# Patient Record
Sex: MALE | Race: ASIAN | NOT HISPANIC OR LATINO | Employment: UNEMPLOYED | URBAN - METROPOLITAN AREA
[De-identification: names, ages, dates, MRNs, and addresses within clinical notes are randomized per-mention and may not be internally consistent; named-entity substitution may affect disease eponyms.]

---

## 2020-01-20 ENCOUNTER — THERAPY VISIT (OUTPATIENT)
Dept: PHYSICAL THERAPY | Facility: CLINIC | Age: 70
End: 2020-01-20
Payer: MEDICARE

## 2020-01-20 DIAGNOSIS — M25.512 CHRONIC LEFT SHOULDER PAIN: ICD-10-CM

## 2020-01-20 DIAGNOSIS — G89.29 CHRONIC LEFT SHOULDER PAIN: ICD-10-CM

## 2020-01-20 PROCEDURE — 97035 APP MDLTY 1+ULTRASOUND EA 15: CPT | Mod: GP | Performed by: PHYSICAL THERAPIST

## 2020-01-20 PROCEDURE — 97110 THERAPEUTIC EXERCISES: CPT | Mod: GP | Performed by: PHYSICAL THERAPIST

## 2020-01-20 PROCEDURE — 97161 PT EVAL LOW COMPLEX 20 MIN: CPT | Mod: GP | Performed by: PHYSICAL THERAPIST

## 2020-01-20 NOTE — LETTER
DEPARTMENT OF HEALTH AND HUMAN SERVICES  CENTERS FOR MEDICARE & MEDICAID SERVICES    PLAN/UPDATED PLAN OF PROGRESS FOR OUTPATIENT REHABILITATION    PATIENTS NAME:  SARKIS Nick   : 1950  PROVIDER NUMBER:    [id number]  HICN:[id number]  PROVIDER NAME: [facility] HILARY JANG  MEDICAL RECORD NUMBER: [id number]   START OF CARE DATE:  SOC Date: 20   TYPE:  PT  PRIMARY/TREATMENT DIAGNOSIS: (Pertinent Medical Diagnosis)  Chronic left shoulder pain  VISITS FROM START OF CARE:  Rxs Used: 1     [place] [facility] Initial Evaluation  Subjective:  The history is provided by the patient. The history is limited by a language barrier. A  was used.   SARKIS Nick being seen for Left shoulder to the elbow.   Problem began 2020 (MD consult). Where condition occurred: for unknown reasons.Problem occurred: unknown  and reported as 7/10 on pain scale. General health as reported by patient is fair. Pertinent medical history includes:  Kidney disease, osteoarthritis, high blood pressure and depression.   Other medical allergies details: none.  Surgeries include:  None.  Current medications:  High blood pressure medication. Other medications details: takes 2 others, unsure what they are.    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  Pain is described as aching and is constant. Pain is worse during the night. Since onset symptoms are gradually worsening. Special tests:  X-ray.     Patient is none.   Barriers include:  None as reported by patient (lives with his son).  Red flags:  None as reported by patient.  Type of problem:  Left shoulder   Condition occurred with:  Unknown cause. This is a chronic condition   Problem details: Pt notes that he first started feeling pain in his left shoulder in Oct 2019, and it has progressively worsened. He is unsure what caused the pain. He did consult with his PCP for this and was referred to PT..   Patient reports pain:  Upper arm and  lateral. Radiates to:  Elbow. Associated symptoms:  Loss of motion/stiffness, painful arc and loss of strength. Symptoms are exacerbated by lifting, carrying, lying on extremity, using arm at shoulder level, using arm overhead and using arm behind back and relieved by rest (self massage).    Objective:  Standing Alignment:    Cervical/Thoracic:  Forward head (moderate)  Shoulder/UE:  Rounded shoulders (moderate)  Shoulder Evaluation:  ROM:  AROM:    Flexion:  Left:  130 deg      Abduction:  Left: 130 deg     External Rotation:  Left:  73 deg      Extension/Internal Rotation:  Left:  Thumb to same level as R at about T11        Pain: with all movements, worst with flex and abd    Strength:    Flexion: Left:3/5    Pain: ++      Abduction:  Left: 3/5   Pain:++      External Rotation:   Left:3+/5      Pain:+     Elbow Flexion:  Left:4-/5      Pain:+        Special Tests:    Left shoulder positive for the following special tests:  Impingement; Rotator cuff tear and Acromioclavicular  Left shoulder negative for the following special tests:  Bursal    Palpation:    Left shoulder tenderness present at:  Acrimioclavicular; Biceps; Supraspinatus; Infraspinatus; Levator; Upper Trap and Bicipital Groove  Left shoulder tenderness not present at: Deltoid    Assessment/Plan:    Patient is a 69 year old male with left side shoulder complaints.    Patient has the following significant findings with corresponding treatment plan.                Diagnosis 1:  L shoulder strain  Pain -  hot/cold therapy, US, self management, education and home program  Decreased ROM/flexibility - therapeutic exercise  Decreased strength - therapeutic exercise and therapeutic activities  Impaired muscle performance - neuro re-education  Decreased function - therapeutic activities  Impaired posture - neuro re-education and therapeutic activities    Therapy Evaluation Codes:   1) History comprised of:   Personal factors that impact the plan of care:       Education, Language, Past/current experiences, Social history/culture and Time since onset of symptoms.    Comorbidity factors that impact the plan of care are:      Depression, High blood pressure and Osteoarthritis.     Medications impacting care: High blood pressure.  2) Examination of Body Systems comprised of:   Body structures and functions that impact the plan of care:      Shoulder.   Activity limitations that impact the plan of care are:      Dressing, Lifting, Laying down and Reaching.  3) Clinical presentation characteristics are:   Stable/Uncomplicated.  4) Decision-Making    Low complexity using standardized patient assessment instrument and/or measureable assessment of functional outcome.  Cumulative Therapy Evaluation is: Low complexity.  Previous and current functional limitations:  (See Goal Flow Sheet for this information)    Short term and Long term goals: (See Goal Flow Sheet for this information)   Communication ability:  Patient has an  for communication clarity.  Treatment Explanation - The following has been discussed with the patient:   RX ordered/plan of care  Anticipated outcomes  Possible risks and side effects  This patient would benefit from PT intervention to resume normal activities.   Rehab potential is good.  Frequency:  1 X week, once daily  Duration:  for 6 weeks  Discharge Plan:  Achieve all LTG.  Independent in home treatment program.  Reach maximal therapeutic benefit.    Please refer to the daily flowsheet for treatment today, total treatment time and time spent performing 1:1 timed codes.         Caregiver Signature/Credentials ______________________________________________ Date ________        HIRAM Fisher   I have reviewed and certified the need for these services and plan of treatment while under my care.        PHYSICIAN'S SIGNATURE:   ____________________________________________  Date___________     Elise Craig MD    Certification period:   "Beginning of Cert date period: 01/20/20 to  End of Cert period date: 03/29/20   Functional Level Progress Report: Please see attached \"Goal Flow sheet for Functional level.\"  ____X____ Continue Services or       ________ DC Services              Service dates: From  SOC Date: 01/20/20 date to present                         "

## 2020-01-20 NOTE — PROGRESS NOTES
[place] [facility] Initial Evaluation  Subjective:  The history is provided by the patient. The history is limited by a language barrier. A  was used.   SARKIS Nick being seen for Left shoulder to the elbow.   Problem began 1/8/2020 (MD consult). Where condition occurred: for unknown reasons.Problem occurred: unknown  and reported as 7/10 on pain scale. General health as reported by patient is fair. Pertinent medical history includes:  Kidney disease, osteoarthritis, high blood pressure and depression.   Other medical allergies details: none.  Surgeries include:  None.  Current medications:  High blood pressure medication. Other medications details: takes 2 others, unsure what they are.    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  Pain is described as aching and is constant. Pain is worse during the night. Since onset symptoms are gradually worsening. Special tests:  X-ray.     Patient is none.   Barriers include:  None as reported by patient (lives with his son).  Red flags:  None as reported by patient.  Type of problem:  Left shoulder   Condition occurred with:  Unknown cause. This is a chronic condition   Problem details: Pt notes that he first started feeling pain in his left shoulder in Oct 2019, and it has progressively worsened. He is unsure what caused the pain. He did consult with his PCP for this and was referred to PT..   Patient reports pain:  Upper arm and lateral. Radiates to:  Elbow. Associated symptoms:  Loss of motion/stiffness, painful arc and loss of strength. Symptoms are exacerbated by lifting, carrying, lying on extremity, using arm at shoulder level, using arm overhead and using arm behind back and relieved by rest (self massage).                      Objective:  Standing Alignment:    Cervical/Thoracic:  Forward head (moderate)  Shoulder/UE:  Rounded shoulders (moderate)                                       Shoulder Evaluation:  ROM:  AROM:    Flexion:  Left:   130 deg        Abduction:  Left: 130 deg         External Rotation:  Left:  73 deg                Extension/Internal Rotation:  Left:  Thumb to same level as R at about T11          Pain: with all movements, worst with flex and abd    Strength:    Flexion: Left:3/5    Pain: ++        Abduction:  Left: 3/5   Pain:++          External Rotation:   Left:3+/5      Pain:+         Elbow Flexion:  Left:4-/5      Pain:+          Special Tests:    Left shoulder positive for the following special tests:  Impingement; Rotator cuff tear and Acromioclavicular  Left shoulder negative for the following special tests:  Bursal    Palpation:    Left shoulder tenderness present at:  Acrimioclavicular; Biceps; Supraspinatus; Infraspinatus; Levator; Upper Trap and Bicipital Groove  Left shoulder tenderness not present at: Deltoid                                       General     ROS    Assessment/Plan:    Patient is a 69 year old male with left side shoulder complaints.    Patient has the following significant findings with corresponding treatment plan.                Diagnosis 1:  L shoulder strain  Pain -  hot/cold therapy, US, self management, education and home program  Decreased ROM/flexibility - therapeutic exercise  Decreased strength - therapeutic exercise and therapeutic activities  Impaired muscle performance - neuro re-education  Decreased function - therapeutic activities  Impaired posture - neuro re-education and therapeutic activities    Therapy Evaluation Codes:   1) History comprised of:   Personal factors that impact the plan of care:      Education, Language, Past/current experiences, Social history/culture and Time since onset of symptoms.    Comorbidity factors that impact the plan of care are:      Depression, High blood pressure and Osteoarthritis.     Medications impacting care: High blood pressure.  2) Examination of Body Systems comprised of:   Body structures and functions that impact the plan of care:       Shoulder.   Activity limitations that impact the plan of care are:      Dressing, Lifting, Laying down and Reaching.  3) Clinical presentation characteristics are:   Stable/Uncomplicated.  4) Decision-Making    Low complexity using standardized patient assessment instrument and/or measureable assessment of functional outcome.  Cumulative Therapy Evaluation is: Low complexity.    Previous and current functional limitations:  (See Goal Flow Sheet for this information)    Short term and Long term goals: (See Goal Flow Sheet for this information)     Communication ability:  Patient has an  for communication clarity.  Treatment Explanation - The following has been discussed with the patient:   RX ordered/plan of care  Anticipated outcomes  Possible risks and side effects  This patient would benefit from PT intervention to resume normal activities.   Rehab potential is good.    Frequency:  1 X week, once daily  Duration:  for 6 weeks  Discharge Plan:  Achieve all LTG.  Independent in home treatment program.  Reach maximal therapeutic benefit.    Please refer to the daily flowsheet for treatment today, total treatment time and time spent performing 1:1 timed codes.

## 2020-01-27 ENCOUNTER — THERAPY VISIT (OUTPATIENT)
Dept: PHYSICAL THERAPY | Facility: CLINIC | Age: 70
End: 2020-01-27
Payer: MEDICARE

## 2020-01-27 DIAGNOSIS — M25.512 CHRONIC LEFT SHOULDER PAIN: ICD-10-CM

## 2020-01-27 DIAGNOSIS — G89.29 CHRONIC LEFT SHOULDER PAIN: ICD-10-CM

## 2020-01-27 PROCEDURE — 97035 APP MDLTY 1+ULTRASOUND EA 15: CPT | Mod: GP | Performed by: PHYSICAL THERAPIST

## 2020-01-27 PROCEDURE — 97110 THERAPEUTIC EXERCISES: CPT | Mod: GP | Performed by: PHYSICAL THERAPIST

## 2020-02-03 ENCOUNTER — THERAPY VISIT (OUTPATIENT)
Dept: PHYSICAL THERAPY | Facility: CLINIC | Age: 70
End: 2020-02-03
Payer: MEDICARE

## 2020-02-03 DIAGNOSIS — G89.29 CHRONIC LEFT SHOULDER PAIN: ICD-10-CM

## 2020-02-03 DIAGNOSIS — M25.512 CHRONIC LEFT SHOULDER PAIN: ICD-10-CM

## 2020-02-03 PROCEDURE — 97035 APP MDLTY 1+ULTRASOUND EA 15: CPT | Mod: CQ

## 2020-02-03 PROCEDURE — 97110 THERAPEUTIC EXERCISES: CPT | Mod: GP

## 2020-02-10 ENCOUNTER — THERAPY VISIT (OUTPATIENT)
Dept: PHYSICAL THERAPY | Facility: CLINIC | Age: 70
End: 2020-02-10
Payer: MEDICARE

## 2020-02-10 DIAGNOSIS — G89.29 CHRONIC LEFT SHOULDER PAIN: ICD-10-CM

## 2020-02-10 DIAGNOSIS — M25.512 CHRONIC LEFT SHOULDER PAIN: ICD-10-CM

## 2020-02-10 PROCEDURE — 97110 THERAPEUTIC EXERCISES: CPT | Mod: GP | Performed by: PHYSICAL THERAPIST

## 2020-02-10 PROCEDURE — 97035 APP MDLTY 1+ULTRASOUND EA 15: CPT | Mod: GP | Performed by: PHYSICAL THERAPIST

## 2020-02-10 PROCEDURE — 97112 NEUROMUSCULAR REEDUCATION: CPT | Mod: GP | Performed by: PHYSICAL THERAPIST

## 2020-02-17 ENCOUNTER — THERAPY VISIT (OUTPATIENT)
Dept: PHYSICAL THERAPY | Facility: CLINIC | Age: 70
End: 2020-02-17
Payer: MEDICARE

## 2020-02-17 DIAGNOSIS — G89.29 CHRONIC LEFT SHOULDER PAIN: ICD-10-CM

## 2020-02-17 DIAGNOSIS — M25.512 CHRONIC LEFT SHOULDER PAIN: ICD-10-CM

## 2020-02-17 PROCEDURE — 97110 THERAPEUTIC EXERCISES: CPT | Mod: CQ

## 2020-02-17 PROCEDURE — 97035 APP MDLTY 1+ULTRASOUND EA 15: CPT | Mod: CQ

## 2020-02-17 PROCEDURE — 97112 NEUROMUSCULAR REEDUCATION: CPT | Mod: CQ

## 2020-02-24 ENCOUNTER — THERAPY VISIT (OUTPATIENT)
Dept: PHYSICAL THERAPY | Facility: CLINIC | Age: 70
End: 2020-02-24
Payer: MEDICARE

## 2020-02-24 DIAGNOSIS — M25.512 CHRONIC LEFT SHOULDER PAIN: ICD-10-CM

## 2020-02-24 DIAGNOSIS — G89.29 CHRONIC LEFT SHOULDER PAIN: ICD-10-CM

## 2020-02-24 PROCEDURE — 97530 THERAPEUTIC ACTIVITIES: CPT | Mod: GP | Performed by: PHYSICAL THERAPIST

## 2020-02-24 PROCEDURE — 97110 THERAPEUTIC EXERCISES: CPT | Mod: GP | Performed by: PHYSICAL THERAPIST

## 2020-02-24 PROCEDURE — 97112 NEUROMUSCULAR REEDUCATION: CPT | Mod: GP | Performed by: PHYSICAL THERAPIST

## 2020-02-24 NOTE — PROGRESS NOTES
Subjective:  HPI  Physical Exam                    Objective:  System    Physical Exam    General     ROS    Assessment/Plan:    PROGRESS  REPORT    Progress reporting period is from 1/20/20 to 2/24/20.       SUBJECTIVE  Subjective changes noted by patient:   Pt notes that his shoulder hurts more today even though he did not do anything out of the ordinary in the past couple of days. He notes that he is going for an MRI later today.    Current pain level is  7/10.     Previous pain level was  4/10  .   Changes in function:  Yes (See Goal flowsheet attached for changes in current functional level)  Adverse reaction to treatment or activity: None    OBJECTIVE  Changes noted in objective findings:    On pulleys, has full flex and abd in L shoulder.  L shoulder AROM: flex 165, abd 155 but w/pain at about 120, ER 85 w/pain, IR/ext gets thumb to T8 w/milder pain. Strength L shoulder: flex 4-/5 w/pain, abd 4/5 w/pain, ER 4-/5 w/pain (but equal strength on R).      ASSESSMENT/PLAN  Updated problem list and treatment plan: Diagnosis 1:  L RC strain  Pain -  hot/cold therapy, self management, education and home program  Decreased strength - therapeutic exercise and therapeutic activities  Impaired muscle performance - neuro re-education  Decreased function - therapeutic activities  STG/LTGs have been met or progress has been made towards goals:  Yes (See Goal flow sheet completed today.) and none except for last session.  Assessment of Progress: The patient's progress has plateaued.  The patient has had set backs in their progress.  Patient is meeting short term goals and is progressing towards long term goals.  Self Management Plans:  Patient has been instructed in a home treatment program.  Patient  has been instructed in self management of symptoms.  I have re-evaluated this patient and find that the nature, scope, duration and intensity of the therapy is appropriate for the medical condition of the patient.  Ka continues  to require the following intervention to meet STG and LTG's:  PT    Recommendations:  This patient would benefit from continued therapy.     Frequency:  1 X week, once daily  Duration:  for 4 weeks        Please refer to the daily flowsheet for treatment today, total treatment time and time spent performing 1:1 timed codes.

## 2020-04-15 PROBLEM — M25.512 CHRONIC LEFT SHOULDER PAIN: Status: RESOLVED | Noted: 2020-01-20 | Resolved: 2020-04-15

## 2020-04-15 PROBLEM — G89.29 CHRONIC LEFT SHOULDER PAIN: Status: RESOLVED | Noted: 2020-01-20 | Resolved: 2020-04-15

## 2020-04-15 NOTE — PROGRESS NOTES
Patient did not return for further treatment and no additional progress was noted.  Please refer to the progress note and goal flowsheet completed on 02/24/20 for discharge information.

## 2023-09-22 ENCOUNTER — PATIENT OUTREACH (OUTPATIENT)
Dept: ONCOLOGY | Facility: CLINIC | Age: 73
End: 2023-09-22
Payer: MEDICARE

## 2023-09-22 ENCOUNTER — TRANSCRIBE ORDERS (OUTPATIENT)
Dept: ONCOLOGY | Facility: CLINIC | Age: 73
End: 2023-09-22
Payer: MEDICARE

## 2023-09-22 DIAGNOSIS — C61 PROSTATE CANCER (H): Primary | ICD-10-CM

## 2023-09-22 NOTE — PROGRESS NOTES
New Patient Oncology Nurse Navigator Note     Referring provider:   Oliverio Maldonado MD, MS  Medical Director  [facility]  [facility], [place], [place]        Referred to (specialty): Medical Oncology    Requested provider (if applicable):   Dr. Fry     Date Referral Received:   9/22/23     Evaluation for :   Stage IV metastatic prostate cancer, Gill score 5+5=10     Clinical History (per Nurse review of records provided):    Stage IV prostate cancer      Clinical Assessment / Barriers to Care (Per Nurse): Hmong speaking       Records Location (Care Everywhere, Media, etc.):      Care Everywhere      Dr. Maldonado reached out via email to Dr. Fry to inquire about seeing this patient for second opinion/clinical trials.  Follow up plan:  - Stop Zytiga and Prednisone now  - Get bone scan and CT CAP now (Scheduled 9/27)  - Follow up TBD - Sending to N for referral     Contact is daughter: Norma Norman at [phone]    Our new patient scheduling reached out to daughter and scheduled appointment for Monday add on with Dr. Fry.  Dr. Maldonado informed.  Daughter provided with my number for questions.  Late request sent today to records team to obtain images an other per protocol.

## 2023-09-25 ENCOUNTER — PRE VISIT (OUTPATIENT)
Dept: ONCOLOGY | Facility: CLINIC | Age: 73
End: 2023-09-25
Payer: MEDICARE

## 2023-09-25 ENCOUNTER — PATIENT OUTREACH (OUTPATIENT)
Dept: ONCOLOGY | Facility: CLINIC | Age: 73
End: 2023-09-25

## 2023-09-25 ENCOUNTER — ONCOLOGY VISIT (OUTPATIENT)
Dept: ONCOLOGY | Facility: CLINIC | Age: 73
End: 2023-09-25
Attending: INTERNAL MEDICINE
Payer: MEDICARE

## 2023-09-25 VITALS
DIASTOLIC BLOOD PRESSURE: 76 MMHG | OXYGEN SATURATION: 97 % | TEMPERATURE: 98.1 F | SYSTOLIC BLOOD PRESSURE: 206 MMHG | HEART RATE: 68 BPM | WEIGHT: 163.7 LBS | RESPIRATION RATE: 16 BRPM

## 2023-09-25 DIAGNOSIS — C61 MALIGNANT NEOPLASM OF PROSTATE (H): Primary | ICD-10-CM

## 2023-09-25 PROCEDURE — 99205 OFFICE O/P NEW HI 60 MIN: CPT | Performed by: INTERNAL MEDICINE

## 2023-09-25 PROCEDURE — 99417 PROLNG OP E/M EACH 15 MIN: CPT | Performed by: INTERNAL MEDICINE

## 2023-09-25 PROCEDURE — G0463 HOSPITAL OUTPT CLINIC VISIT: HCPCS | Performed by: INTERNAL MEDICINE

## 2023-09-25 RX ORDER — METOPROLOL SUCCINATE 100 MG/1
100 TABLET, EXTENDED RELEASE ORAL DAILY
COMMUNITY
Start: 2023-01-19

## 2023-09-25 RX ORDER — METFORMIN HCL 500 MG
500 TABLET, EXTENDED RELEASE 24 HR ORAL 2 TIMES DAILY
COMMUNITY
Start: 2023-03-30

## 2023-09-25 RX ORDER — LOSARTAN POTASSIUM 25 MG/1
25 TABLET ORAL DAILY
COMMUNITY
Start: 2023-01-19

## 2023-09-25 ASSESSMENT — PAIN SCALES - GENERAL: PAINLEVEL: NO PAIN (0)

## 2023-09-25 NOTE — NURSING NOTE
Oncology Rooming Note    September 25, 2023 10:48 AM   Park Norman is a 73 year old male who presents for:    Chief Complaint   Patient presents with    Prostate Cancer     Initial Vitals: BP (!) 206/76 (BP Location: Right arm, Patient Position: Sitting, Cuff Size: Adult Regular)   Pulse 68   Temp 98.1  F (36.7  C) (Oral)   Resp 16   Wt 74.3 kg (163 lb 11.2 oz)   SpO2 97%  There is no height or weight on file to calculate BMI. There is no height or weight on file to calculate BSA.  No Pain (0) Comment: Data Unavailable   No LMP for male patient.  Allergies reviewed: Yes  Medications reviewed: Yes    Medications: Medication refills not needed today.  Pharmacy name entered into EPIC: Data Unavailable    Clinical concerns: Pt BP is high (206/76). They have hypertension, but did not take BP meds today.        Peyton Rubio

## 2023-09-25 NOTE — TELEPHONE ENCOUNTER
RECORDS STATUS - ALL OTHER DIAGNOSIS      Action September 25, 2023 9:05 AM    Action Taken - Called [facility] and [facility] Film Room to push all images to  PACS.  - Request is faxed to [facility] for the prostate genetic testing panel report  - Request is faxed to [facility] for records.  - Path report is positive, request and label is faxed to [facility].   [facility] [employer] Tracking #: [id number]    10:39 AM:  Received Genetic report from [facility]. Faxed to HIM to upload and emailed to .      Imaging Received  September 25, 2023 9:28 AM    Action: Images from [facility] received and resolved to PACS.       RECORDS RECEIVED FROM:    DATE RECEIVED:    NOTES STATUS DETAILS   OFFICE NOTE from referring provider - [facility] 9/15/23: Dr. Oliverio Maldonado   OFFICE NOTE from radiation oncologist [facility]- [facility] 12/12/22: Dr. Hill Marshall   OFFICE NOTE from other specialist [facility] 3/8/21: Dr. Theodore Hollingsworth   OPERATIVE REPORT External: [facility] 1/14/21: Prostate Bx   MEDICATION LIST [facility]-     LABS  [facility] [employer] Tracking #: [id number]   PATHOLOGY REPORTS External: [facility] (slides requested) 1/14/21: [id number] (positive)   ANYTHING RELATED TO DIAGNOSIS [facility]- [facility] 9/15/23   GENONOMIC TESTING     TYPE: [facility]- [facility] (request report from ) 12/8/21: [id number]  12/8/21: [id number]   IMAGING (NEED IMAGES & REPORT)     CT SCANS (Img req from [facility]) 6/27/23, 12/9/22, 8/23/21, 3/15/21: CT Chest Abd Pel  5/22/21: CT Abd Pel   MRI (Img req from [facility]) 12/29/20: MR Prostate   NM (Img req from [facility]) 6/27/23, 12/9/22, 8/29/22, 8/23/21: NM Whole Body

## 2023-09-25 NOTE — PROGRESS NOTES
NEW PATIENT SECOND OPINION CONSULTATION    Reason for Visit:  Metastatic CRPC s/p abiraterone (and docetaxel for mHSPC), discussion of clinical trial options.    History of Present Illness:  Dear Dr Oliverio Maldonado,    Thank you for referring your patient for a Second Opinion consultation at the [facility].  A brief overview of his oncological history as well as my recommendations follow below.    Mr Norman is a Hmong-speaking gentleman who was diagnosed with metastatic prostate cancer in late 2020.  His PSA level on 12/2020 was 63 ng/mL.  He underwent a bone scan at that time, which showed widespread osseous metastases.  He had a prostate biopsy (1/14/2021) which revealed Diamond 5+5=10 adenocarcinoma.    He began androgen deprivation therapy (ADT) on 1/21/2021.  Up-front docetaxel chemotherapy was also given, for 6 cycles, between 3/31/2021 and 7/14/2021.  He subsequently developed metastatic CRPC in 12/2021.  Abiraterone was started on 12/28/2021.  This resulted in a favorable treatment response initially; however, the patient has recently developed biochemical progression again.  His most recent PSA level (9/15/2023) was 6.7 ng/mL, which has increased from a dodie of 0.7 ng/mL.    He was referred to me for a consideration of clinical trial options.  His abiraterone was stopped on 9/15/2023.  A bone scan and CT scan have been scheduled for this Wednesday (9/27/2023).  The patient was accompanied today by his daughter.    Review of Systems:  Mr Norman reports feeling generally well.  He does have back pain, which might possibly be related to his metastatic prostate cancer.  He does report hot flashes, although these have improved with the use of venlafaxine.  He reports shrinking of his testicles.  He has not lost or gained any weight recently.  A comprehensive 14-system review of symptoms is otherwise generally within normal limits except as described above.  His ECOG score is 0.  His  pain score is 2/10.    Past Medical History:  Aortic insufficiency  Diabetes mellitus, type 2  Hypertension  Gastroesophageal reflux disease  Chronic gout  Kidney stones    Medications:   Eligard 45 mg SQ q6mo  Aspirin 81 mg  Metoprolol 100 mg  Losartan 100 mg  Metformin 500 mg BID  Tamsulosin 0.4 mg  Venlafaxine 37.5 mg  Acetaminophen 325 mg q4h PRN    Allergies:  No known drug allergies.    Social History:  The [place] is [place], and speaks [employer].  He lives in [place], [place].  He is , and retired.  He lives with his son and daughter.  He has never smoked cigarettes.  He does not consume alcohol.    Family History:  There is no family history of genitourinary cancers or prostate cancer.  To my understanding, he has not had germline genetic testing.    Physical Examination:  Mr. Norman is a 73-year-old man who appears comfortable at rest.  His vital signs are unremarkable.  His HEENT examination is generally within normal limits.  There is no palpable supraclavicular, cervical, axillary or inguinal lymphadenopathy.  His cardiovascular, pulmonary, and gastrointestinal examinations are generally within normal limits.  The neuromuscular examination is grossly intact.  The extremities do not demonstrate pitting edema.  The skin evaluation is unremarkable.      ASSESSMENT AND PLAN:  Mr. Norman is a 73-year-old man with metastatic CRPC s/p abiraterone, who also received 6 cycles of docetaxel for metastatic hormone-sensitive prostate cancer.  His PSA level is now rising again (6.7 ng/mL), and he stopped the abiraterone on 9/15/2023.  He is here to discuss clinical trial participation.  His ECOG score is 0.  His pain score is 2/10.    Before discussing our clinical trial portfolio, we reviewed some of the other options that are available to him.  For example, he could consider a second AR-directed agent such as enzalutamide, although this option is not ideal in someone who has previously received  "abiraterone.  He could also receive a second chemotherapy agent, such as cabazitaxel, but I did not recommend more chemotherapy at this time.  Third, he could possibly receive 177Lu-PSMA-617 radioligand therapy, since he has been previously treated with both abiraterone and docetaxel (although the latter was for hormone-sensitive disease).  Finally, due to his predominant bone metastases, which may be symptomatic now, he could receive Ra-223 treatment as well.  Thus, he has multiple therapeutic options.    We then turned our attention to discuss our clinical trial portfolio.  He does appear eligible for the ECLIPSE study, and this would be my first choice.  This study uses a novel radioligand agent called 177Lu-PSMA-I&T in the pre-chemotherapy setting.  He would either be randomized to the radiopharmaceutical agent (67% chance) or to enzalutamide (33% chance).  This clinical trial would be my first choice for him, if he is eligible.  We also have the STEP-UP study that utilizes high-dose testosterone (\"bipolar androgen therapy\") as a treatment for castration-resistant prostate cancer.  That trial would be my second choice if he does not fulfill eligibility for the ECLIPSE trial.  Of note, the challenge with both trials is that we we will have to translate the consent forms into ong for him to participate.  I will try to determine within the next few days if this will be possible, and if not then I will refer him for standard 177Lu-PSMA-617 therapy.    We also briefly discussed germline and somatic genetic testing.  I will begin by ordering a somatic NGS panel ([facility] [employer] [facility]) using his prostate biopsy dated 1/4/2021.  If he is found to have any tumoral DNA-repair gene mutations, then I will pursue germline genetic testing afterwards.  If he is found to have an HRR mutation, then this would open the door for potential treatment using a PARP inhibitor as well.    A total of 80 minutes was spent on this patient " on the day of the consultation, of which more than 50% of this time was used for counseling and coordination of care.  The patient and his daughter were given the opportunity to ask multiple questions today, all of which were answered to their satisfaction.    Zhen Fry M.D.

## 2023-09-25 NOTE — LETTER
9/25/2023         RE: Park Norman  [address]  [facility] [address]        Dear Colleague,    Thank you for referring your patient, Park Norman, to the [facility]. Please see a copy of my visit note below.      NEW PATIENT SECOND OPINION CONSULTATION    Reason for Visit:  Metastatic CRPC s/p abiraterone (and docetaxel for mHSPC), discussion of clinical trial options.    History of Present Illness:  Dear Dr Oliverio Maldonado,    Thank you for referring your patient for a Second Opinion consultation at the [facility].  A brief overview of his oncological history as well as my recommendations follow below.    Mr Norman is a Hmong-speaking gentleman who was diagnosed with metastatic prostate cancer in late 2020.  His PSA level on 12/2020 was 63 ng/mL.  He underwent a bone scan at that time, which showed widespread osseous metastases.  He had a prostate biopsy (1/14/2021) which revealed Diamond 5+5=10 adenocarcinoma.    He began androgen deprivation therapy (ADT) on 1/21/2021.  Up-front docetaxel chemotherapy was also given, for 6 cycles, between 3/31/2021 and 7/14/2021.  He subsequently developed metastatic CRPC in 12/2021.  Abiraterone was started on 12/28/2021.  This resulted in a favorable treatment response initially; however, the patient has recently developed biochemical progression again.  His most recent PSA level (9/15/2023) was 6.7 ng/mL, which has increased from a dodie of 0.7 ng/mL.    He was referred to me for a consideration of clinical trial options.  His abiraterone was stopped on 9/15/2023.  A bone scan and CT scan have been scheduled for this Wednesday (9/27/2023).  The patient was accompanied today by his daughter.    Review of Systems:  Mr Norman reports feeling generally well.  He does have back pain, which might possibly be related to his metastatic prostate cancer.  He does report hot flashes, although these have improved with the use of  venlafaxine.  He reports shrinking of his testicles.  He has not lost or gained any weight recently.  A comprehensive 14-system review of symptoms is otherwise generally within normal limits except as described above.  His ECOG score is 0.  His pain score is 2/10.    Past Medical History:  Aortic insufficiency  Diabetes mellitus, type 2  Hypertension  Gastroesophageal reflux disease  Chronic gout  Kidney stones    Medications:   Eligard 45 mg SQ q6mo  Aspirin 81 mg  Metoprolol 100 mg  Losartan 100 mg  Metformin 500 mg BID  Tamsulosin 0.4 mg  Venlafaxine 37.5 mg  Acetaminophen 325 mg q4h PRN    Allergies:  No known drug allergies.    Social History:  The patient is originally from [place], and speaks [employer] [place].  He lives in [place], [place].  He is , and retired.  He lives with his son and daughter.  He has never smoked cigarettes.  He does not consume alcohol.    Family History:  There is no family history of genitourinary cancers or prostate cancer.  To my understanding, he has not had germline genetic testing.    Physical Examination:  Mr. Norman is a 73-year-old man who appears comfortable at rest.  His vital signs are unremarkable.  His HEENT examination is generally within normal limits.  There is no palpable supraclavicular, cervical, axillary or inguinal lymphadenopathy.  His cardiovascular, pulmonary, and gastrointestinal examinations are generally within normal limits.  The neuromuscular examination is grossly intact.  The extremities do not demonstrate pitting edema.  The skin evaluation is unremarkable.      ASSESSMENT AND PLAN:  Mr. Norman is a 73-year-old man with metastatic CRPC s/p abiraterone, who also received 6 cycles of docetaxel for metastatic hormone-sensitive prostate cancer.  His PSA level is now rising again (6.7 ng/mL), and he stopped the abiraterone on 9/15/2023.  He is here to discuss clinical trial participation.  His ECOG score is 0.  His pain score is 2/10.    Before discussing our clinical  "trial portfolio, we reviewed some of the other options that are available to him.  For example, he could consider a second AR-directed agent such as enzalutamide, although this option is not ideal in someone who has previously received abiraterone.  He could also receive a second chemotherapy agent, such as cabazitaxel, but I did not recommend more chemotherapy at this time.  Third, he could possibly receive 177Lu-PSMA-617 radioligand therapy, since he has been previously treated with both abiraterone and docetaxel (although the latter was for hormone-sensitive disease).  Finally, due to his predominant bone metastases, which may be symptomatic now, he could receive Ra-223 treatment as well.  Thus, he has multiple therapeutic options.    We then turned our attention to discuss our clinical trial portfolio.  He does appear eligible for the ECLIPSE study, and this would be my first choice.  This study uses a novel radioligand agent called 177Lu-PSMA-I&T in the pre-chemotherapy setting.  He would either be randomized to the radiopharmaceutical agent (67% chance) or to enzalutamide (33% chance).  This clinical trial would be my first choice for him, if he is eligible.  We also have the STEP-UP study that utilizes high-dose testosterone (\"bipolar androgen therapy\") as a treatment for castration-resistant prostate cancer.  That trial would be my second choice if he does not fulfill eligibility for the ECLIPSE trial.  Of note, the challenge with both trials is that we we will have to translate the consent forms into ong for him to participate.  I will try to determine within the next few days if this will be possible, and if not then I will refer him for standard 177Lu-PSMA-617 therapy.    We also briefly discussed germline and somatic genetic testing.  I will begin by ordering a somatic NGS panel ([employer]) using his prostate biopsy dated 1/4/2021.  If he is found to have any tumoral DNA-repair gene " mutations, then I will pursue germline genetic testing afterwards.  If he is found to have an HRR mutation, then this would open the door for potential treatment using a PARP inhibitor as well.    A total of 80 minutes was spent on this patient on the day of the consultation, of which more than 50% of this time was used for counseling and coordination of care.  The patient and his daughter were given the opportunity to ask multiple questions today, all of which were answered to their satisfaction.    Zhen Fry M.D.

## 2023-09-26 ENCOUNTER — PATIENT OUTREACH (OUTPATIENT)
Dept: ONCOLOGY | Facility: CLINIC | Age: 73
End: 2023-09-26
Payer: MEDICARE

## 2023-09-26 NOTE — PROGRESS NOTES
[facility]: Cancer Care Short Note                                                                                      Per Dr. Fry's request, completed requisition form sent to him for signature. Required 1/14/21 prostate biopsy pathology report, demographic information, and visit notes uploaded with the requisition. All forms will be sent to Larry electronically when signature is complete.      Nellie Marino, RN, BSN  Oncology RN Care Coordinator  [facility]

## 2024-03-30 NOTE — PROGRESS NOTES
FOLLOW UP VISIT    Reason for Visit:  Metastatic CRPC s/p abiraterone (and docetaxel for mHSPC), then enzalutamide for CRPC.  Consideration of 177Lu-PSMA-617 radioligand therapy.    History of Present Illness:  Mr Norman is a Hmong-speaking gentleman who was diagnosed with metastatic prostate cancer in late 2020.  His PSA level on 12/2020 was 63 ng/mL.  He underwent a bone scan at that time, which showed widespread osseous metastases.  He had a prostate biopsy (1/14/2021) which revealed Diamond 5+5=10 adenocarcinoma. No NGS data is available.    He began androgen deprivation therapy (ADT) on 1/21/2021.  Up-front docetaxel chemotherapy was also given, for 6 cycles, between 3/31/2021 and 7/14/2021.  He subsequently developed metastatic CRPC in 12/2021.  Abiraterone was started on 12/28/2021.  This resulted in a favorable treatment response initially; however, the patient has recently developed biochemical progression again.  On 9/15/2023, the PSA was 6.7 ng/mL, which increased from a dodie of 0.7 ng/mL.  By 12/20/23, the PSA was 17.2 ng/mL.  And on 2/28/24, the PSA was 28.6 ng/mL.    He was referred to me for a consideration of clinical trial options versus 177Lu-PSMA-617.  His abiraterone was stopped on 9/15/2023.  He subsequently tried enzalutamide, but this did not produce any PSA response, and it was stopped recently.  A PSMA-PET scan performed on 3/12/2024 showed multiple radiotracer-avid osseous metastases (SUV 21.1 to 54.9) without soft tissue disease.  The patient was accompanied today by his daughter, primarily to discuss 177Lu-PSMA-617 radioligand therapy.    Review of Systems:  Mr Norman reports feeling generally well.  He does have back pain, which might possibly be related to his metastatic prostate cancer.  He also has worsening left hip pain over the past several weeks, but has declined palliative radiotherapy.  Pain is minimal at rest, but is exacerbated with movement.  He does report hot flashes, although  these have improved with the use of venlafaxine.  He reports shrinking of his testicles.  He has not lost or gained any weight recently.  A comprehensive 14-system review of symptoms is otherwise generally within normal limits except as described above.  His ECOG score is 0.  His pain score is 2/10.    Past Medical History:  Aortic insufficiency  Diabetes mellitus, type 2  Hypertension  Hyperlipidemia  Gastroesophageal reflux disease  Chronic gout  Kidney stones  Bone pain  Constipation  Insomnia    Medications:   Eligard 45 mg SQ q6mo  Aspirin 81 mg  Metoprolol 100 mg  Losartan 100 mg  Isosorbide 120 mg  Metformin 500 mg BID  Rosuvastatin 20  Tamsulosin 0.4 mg  Venlafaxine 37.5 mg  Dexamethasone 2 mg BID  Hydromorphone 2 mg PRN  Oxycodone 5-10 mg PRN pain  Acetaminophen 325 mg q4h PRN  Senna 8.6 mg    Physical Examination:  Mr. Norman is a 73-year-old man who appears comfortable at rest.  His vital signs are unremarkable.  His HEENT examination is generally within normal limits.  There is no palpable supraclavicular, cervical, axillary or inguinal lymphadenopathy.  His cardiovascular, pulmonary, and gastrointestinal examinations are generally within normal limits.  The neuromuscular examination is grossly intact.  The extremities do not demonstrate pitting edema.  The skin evaluation is unremarkable.    PSMA-PET scan (3/12/2024):  Findings are consistent with bone-metastatic prostate cancer. There are numerous metastases in the visualized bones showing varying amounts of radiotracer uptake (SUV 21.1 to 54.9). Several examples of lesions showing more intense uptake are given. No convincing evidence for thaddeus or visceral metastases. Enlarged, nodular appearance of the right lobe of the thyroid gland. As mentioned above, radiotracer uptake has been reported within follicular thyroid neoplasms. A 5.8 cm stable nodule was reported in this area on a 10/10/2022 thyroid ultrasound although the nodule did not meet criteria to  recommend FNA at that time.       ASSESSMENT AND PLAN:  Mr. Norman is a 73-year-old man with metastatic CRPC s/p abiraterone, who also received 6 cycles of docetaxel for metastatic hormone-sensitive prostate cancer.  He then received enzalutamide for mCRPC, but derived no benefit.  He is being referred back for consideration of 177Lu-PSMA-617 radioligand therapy versus clinical trial participation.  His ECOG score is 0.  His pain score is 2/10.    Before discussing our clinical trial portfolio, we reviewed some of the other options that are available to him.  For example, he could receive a second chemotherapy agent such as cabazitaxel, but I did not recommend more chemotherapy at this time.  Second, due to his predominant bone metastases which are symptomatic now, he could receive Ra-223 treatment as well.  Third, he could receive 177Lu-PSMA-617 radioligand therapy, since he has been previously treated with both abiraterone/enzalutamide and docetaxel, and his recent PSMA scan showed radiotracer-avid osseous lesions.  Thus, he has multiple therapeutic options remaining.    We then turned our attention to discuss our clinical trial portfolio.  At this time, he would only be eligible for our DOMINGA-280 study, which is a Phase-1 trial using a B7-H3 x CD3 T-cell engager targeting multiple tumor types including prostate cancer.  Since the study just opened, we are not able to estimate the success of this approach compared to 177Lu-PSMA-617.  At the end of our consultation today, we have decided to proceed with 177Lu-PSMA-617 and I will refer him to Nuclear Medicine for radioligand treatment.  He will likely be able to receive his first dose of 177Lu-PSMA-617 over the next 2-3 weeks.    We also briefly discussed germline and somatic genetic testing.  I will begin by ordering a somatic NGS panel ([employer]) using his prostate biopsy dated 1/4/2021.  If he is found to have any tumoral DNA-repair gene mutations, then I  will pursue germline genetic testing afterwards.  If he is found to have an HRR mutation, then this would open the door for potential treatment using a PARP inhibitor as well.    A total of 45 minutes was spent on this patient on the day of the consultation, of which more than 50% of this time was used for counseling and coordination of care.  The patient and his daughter were given the opportunity to ask multiple questions today, all of which were answered to their satisfaction.    The longitudinal plan of care for the diagnosis(es)/condition(s) as documented were addressed during this visit.  Due to the added complexity in care, I will continue to support Mr Norman in the subsequent management and with ongoing continuity of care.    Zhen Fry M.D.

## 2024-04-01 ENCOUNTER — ONCOLOGY VISIT (OUTPATIENT)
Dept: ONCOLOGY | Facility: CLINIC | Age: 74
End: 2024-04-01
Attending: INTERNAL MEDICINE
Payer: MEDICARE

## 2024-04-01 VITALS
RESPIRATION RATE: 16 BRPM | SYSTOLIC BLOOD PRESSURE: 153 MMHG | HEIGHT: 61 IN | OXYGEN SATURATION: 95 % | TEMPERATURE: 98 F | DIASTOLIC BLOOD PRESSURE: 65 MMHG | HEART RATE: 63 BPM | BODY MASS INDEX: 30.83 KG/M2 | WEIGHT: 163.3 LBS

## 2024-04-01 DIAGNOSIS — C61 MALIGNANT NEOPLASM OF PROSTATE (H): Primary | ICD-10-CM

## 2024-04-01 PROCEDURE — 99215 OFFICE O/P EST HI 40 MIN: CPT | Performed by: INTERNAL MEDICINE

## 2024-04-01 PROCEDURE — G0463 HOSPITAL OUTPT CLINIC VISIT: HCPCS | Performed by: INTERNAL MEDICINE

## 2024-04-01 RX ORDER — LORAZEPAM 0.5 MG/1
.5-1 TABLET ORAL EVERY 6 HOURS PRN
Start: 2024-08-08

## 2024-04-01 RX ORDER — LORAZEPAM 0.5 MG/1
.5-1 TABLET ORAL EVERY 6 HOURS PRN
Start: 2024-05-16

## 2024-04-01 RX ORDER — ALBUTEROL SULFATE 0.83 MG/ML
2.5 SOLUTION RESPIRATORY (INHALATION)
OUTPATIENT
Start: 2024-06-27

## 2024-04-01 RX ORDER — PROCHLORPERAZINE MALEATE 5 MG
5 TABLET ORAL EVERY 6 HOURS PRN
Start: 2024-08-08

## 2024-04-01 RX ORDER — ALBUTEROL SULFATE 0.83 MG/ML
2.5 SOLUTION RESPIRATORY (INHALATION)
OUTPATIENT
Start: 2024-08-08

## 2024-04-01 RX ORDER — LORAZEPAM 0.5 MG/1
.5-1 TABLET ORAL EVERY 6 HOURS PRN
Start: 2024-09-19

## 2024-04-01 RX ORDER — MEPERIDINE HYDROCHLORIDE 25 MG/ML
25 INJECTION INTRAMUSCULAR; INTRAVENOUS; SUBCUTANEOUS EVERY 30 MIN PRN
OUTPATIENT
Start: 2024-06-27

## 2024-04-01 RX ORDER — LORAZEPAM 0.5 MG/1
.5-1 TABLET ORAL EVERY 6 HOURS PRN
Start: 2024-06-27

## 2024-04-01 RX ORDER — ALBUTEROL SULFATE 90 UG/1
1-2 AEROSOL, METERED RESPIRATORY (INHALATION)
Start: 2024-04-04

## 2024-04-01 RX ORDER — ALBUTEROL SULFATE 0.83 MG/ML
2.5 SOLUTION RESPIRATORY (INHALATION)
OUTPATIENT
Start: 2024-09-19

## 2024-04-01 RX ORDER — ALBUTEROL SULFATE 0.83 MG/ML
2.5 SOLUTION RESPIRATORY (INHALATION)
OUTPATIENT
Start: 2024-04-04

## 2024-04-01 RX ORDER — METHYLPREDNISOLONE SODIUM SUCCINATE 125 MG/2ML
125 INJECTION, POWDER, LYOPHILIZED, FOR SOLUTION INTRAMUSCULAR; INTRAVENOUS
Start: 2024-08-08

## 2024-04-01 RX ORDER — DIPHENHYDRAMINE HYDROCHLORIDE 50 MG/ML
50 INJECTION INTRAMUSCULAR; INTRAVENOUS
Start: 2024-09-19

## 2024-04-01 RX ORDER — ALBUTEROL SULFATE 90 UG/1
1-2 AEROSOL, METERED RESPIRATORY (INHALATION)
Start: 2024-09-19

## 2024-04-01 RX ORDER — EPINEPHRINE 1 MG/ML
0.3 INJECTION, SOLUTION INTRAMUSCULAR; SUBCUTANEOUS EVERY 5 MIN PRN
OUTPATIENT
Start: 2024-06-27

## 2024-04-01 RX ORDER — LORAZEPAM 2 MG/ML
.5-1 INJECTION INTRAMUSCULAR EVERY 6 HOURS PRN
OUTPATIENT
Start: 2024-06-27

## 2024-04-01 RX ORDER — DIPHENHYDRAMINE HYDROCHLORIDE 50 MG/ML
50 INJECTION INTRAMUSCULAR; INTRAVENOUS
Start: 2024-04-04

## 2024-04-01 RX ORDER — DIPHENHYDRAMINE HYDROCHLORIDE 50 MG/ML
50 INJECTION INTRAMUSCULAR; INTRAVENOUS
Start: 2024-10-31

## 2024-04-01 RX ORDER — EPINEPHRINE 1 MG/ML
0.3 INJECTION, SOLUTION INTRAMUSCULAR; SUBCUTANEOUS EVERY 5 MIN PRN
OUTPATIENT
Start: 2024-05-16

## 2024-04-01 RX ORDER — ONDANSETRON 4 MG/1
8 TABLET, FILM COATED ORAL
OUTPATIENT
Start: 2024-04-04

## 2024-04-01 RX ORDER — ALBUTEROL SULFATE 0.83 MG/ML
2.5 SOLUTION RESPIRATORY (INHALATION)
OUTPATIENT
Start: 2024-05-16

## 2024-04-01 RX ORDER — ONDANSETRON 4 MG/1
8 TABLET, FILM COATED ORAL
OUTPATIENT
Start: 2024-05-16

## 2024-04-01 RX ORDER — ALBUTEROL SULFATE 0.83 MG/ML
2.5 SOLUTION RESPIRATORY (INHALATION)
OUTPATIENT
Start: 2024-10-31

## 2024-04-01 RX ORDER — MEPERIDINE HYDROCHLORIDE 25 MG/ML
25 INJECTION INTRAMUSCULAR; INTRAVENOUS; SUBCUTANEOUS EVERY 30 MIN PRN
OUTPATIENT
Start: 2024-04-04

## 2024-04-01 RX ORDER — METHYLPREDNISOLONE SODIUM SUCCINATE 125 MG/2ML
125 INJECTION, POWDER, LYOPHILIZED, FOR SOLUTION INTRAMUSCULAR; INTRAVENOUS
Start: 2024-09-19

## 2024-04-01 RX ORDER — ONDANSETRON 4 MG/1
8 TABLET, FILM COATED ORAL
OUTPATIENT
Start: 2024-08-08

## 2024-04-01 RX ORDER — EPINEPHRINE 1 MG/ML
0.3 INJECTION, SOLUTION INTRAMUSCULAR; SUBCUTANEOUS EVERY 5 MIN PRN
OUTPATIENT
Start: 2024-08-08

## 2024-04-01 RX ORDER — DIPHENHYDRAMINE HYDROCHLORIDE 50 MG/ML
50 INJECTION INTRAMUSCULAR; INTRAVENOUS
Start: 2024-08-08

## 2024-04-01 RX ORDER — PROCHLORPERAZINE MALEATE 5 MG
5 TABLET ORAL EVERY 6 HOURS PRN
Start: 2024-05-16

## 2024-04-01 RX ORDER — LORAZEPAM 2 MG/ML
.5-1 INJECTION INTRAMUSCULAR EVERY 6 HOURS PRN
OUTPATIENT
Start: 2024-04-04

## 2024-04-01 RX ORDER — ALBUTEROL SULFATE 90 UG/1
1-2 AEROSOL, METERED RESPIRATORY (INHALATION)
Start: 2024-05-16

## 2024-04-01 RX ORDER — PROCHLORPERAZINE MALEATE 5 MG
5 TABLET ORAL EVERY 6 HOURS PRN
Start: 2024-04-04

## 2024-04-01 RX ORDER — METHYLPREDNISOLONE SODIUM SUCCINATE 125 MG/2ML
125 INJECTION, POWDER, LYOPHILIZED, FOR SOLUTION INTRAMUSCULAR; INTRAVENOUS
Start: 2024-10-31

## 2024-04-01 RX ORDER — EPINEPHRINE 1 MG/ML
0.3 INJECTION, SOLUTION INTRAMUSCULAR; SUBCUTANEOUS EVERY 5 MIN PRN
OUTPATIENT
Start: 2024-04-04

## 2024-04-01 RX ORDER — PROCHLORPERAZINE MALEATE 5 MG
5 TABLET ORAL EVERY 6 HOURS PRN
Start: 2024-10-31

## 2024-04-01 RX ORDER — LORAZEPAM 0.5 MG/1
.5-1 TABLET ORAL EVERY 6 HOURS PRN
Start: 2024-10-31

## 2024-04-01 RX ORDER — MEPERIDINE HYDROCHLORIDE 25 MG/ML
25 INJECTION INTRAMUSCULAR; INTRAVENOUS; SUBCUTANEOUS EVERY 30 MIN PRN
OUTPATIENT
Start: 2024-08-08

## 2024-04-01 RX ORDER — LORAZEPAM 2 MG/ML
.5-1 INJECTION INTRAMUSCULAR EVERY 6 HOURS PRN
OUTPATIENT
Start: 2024-05-16

## 2024-04-01 RX ORDER — MEPERIDINE HYDROCHLORIDE 25 MG/ML
25 INJECTION INTRAMUSCULAR; INTRAVENOUS; SUBCUTANEOUS EVERY 30 MIN PRN
OUTPATIENT
Start: 2024-09-19

## 2024-04-01 RX ORDER — ALBUTEROL SULFATE 90 UG/1
1-2 AEROSOL, METERED RESPIRATORY (INHALATION)
Start: 2024-08-08

## 2024-04-01 RX ORDER — LORAZEPAM 2 MG/ML
.5-1 INJECTION INTRAMUSCULAR EVERY 6 HOURS PRN
OUTPATIENT
Start: 2024-09-19

## 2024-04-01 RX ORDER — LORAZEPAM 2 MG/ML
.5-1 INJECTION INTRAMUSCULAR EVERY 6 HOURS PRN
OUTPATIENT
Start: 2024-08-08

## 2024-04-01 RX ORDER — EPINEPHRINE 1 MG/ML
0.3 INJECTION, SOLUTION INTRAMUSCULAR; SUBCUTANEOUS EVERY 5 MIN PRN
OUTPATIENT
Start: 2024-09-19

## 2024-04-01 RX ORDER — METHYLPREDNISOLONE SODIUM SUCCINATE 125 MG/2ML
125 INJECTION, POWDER, LYOPHILIZED, FOR SOLUTION INTRAMUSCULAR; INTRAVENOUS
Start: 2024-04-04

## 2024-04-01 RX ORDER — ALBUTEROL SULFATE 90 UG/1
1-2 AEROSOL, METERED RESPIRATORY (INHALATION)
Start: 2024-06-27

## 2024-04-01 RX ORDER — MEPERIDINE HYDROCHLORIDE 25 MG/ML
25 INJECTION INTRAMUSCULAR; INTRAVENOUS; SUBCUTANEOUS EVERY 30 MIN PRN
OUTPATIENT
Start: 2024-10-31

## 2024-04-01 RX ORDER — ONDANSETRON 4 MG/1
8 TABLET, FILM COATED ORAL
OUTPATIENT
Start: 2024-10-31

## 2024-04-01 RX ORDER — DIPHENHYDRAMINE HYDROCHLORIDE 50 MG/ML
50 INJECTION INTRAMUSCULAR; INTRAVENOUS
Start: 2024-05-16

## 2024-04-01 RX ORDER — METHYLPREDNISOLONE SODIUM SUCCINATE 125 MG/2ML
125 INJECTION, POWDER, LYOPHILIZED, FOR SOLUTION INTRAMUSCULAR; INTRAVENOUS
Start: 2024-05-16

## 2024-04-01 RX ORDER — DIPHENHYDRAMINE HYDROCHLORIDE 50 MG/ML
50 INJECTION INTRAMUSCULAR; INTRAVENOUS
Start: 2024-06-27

## 2024-04-01 RX ORDER — ONDANSETRON 4 MG/1
8 TABLET, FILM COATED ORAL
OUTPATIENT
Start: 2024-09-19

## 2024-04-01 RX ORDER — MEPERIDINE HYDROCHLORIDE 25 MG/ML
25 INJECTION INTRAMUSCULAR; INTRAVENOUS; SUBCUTANEOUS EVERY 30 MIN PRN
OUTPATIENT
Start: 2024-05-16

## 2024-04-01 RX ORDER — PROCHLORPERAZINE MALEATE 5 MG
5 TABLET ORAL EVERY 6 HOURS PRN
Start: 2024-09-19

## 2024-04-01 RX ORDER — LORAZEPAM 2 MG/ML
.5-1 INJECTION INTRAMUSCULAR EVERY 6 HOURS PRN
OUTPATIENT
Start: 2024-10-31

## 2024-04-01 RX ORDER — LORAZEPAM 0.5 MG/1
.5-1 TABLET ORAL EVERY 6 HOURS PRN
Start: 2024-04-04

## 2024-04-01 RX ORDER — ALBUTEROL SULFATE 90 UG/1
1-2 AEROSOL, METERED RESPIRATORY (INHALATION)
Start: 2024-10-31

## 2024-04-01 RX ORDER — METHYLPREDNISOLONE SODIUM SUCCINATE 125 MG/2ML
125 INJECTION, POWDER, LYOPHILIZED, FOR SOLUTION INTRAMUSCULAR; INTRAVENOUS
Start: 2024-06-27

## 2024-04-01 RX ORDER — PROCHLORPERAZINE MALEATE 5 MG
5 TABLET ORAL EVERY 6 HOURS PRN
Start: 2024-06-27

## 2024-04-01 RX ORDER — EPINEPHRINE 1 MG/ML
0.3 INJECTION, SOLUTION INTRAMUSCULAR; SUBCUTANEOUS EVERY 5 MIN PRN
OUTPATIENT
Start: 2024-10-31

## 2024-04-01 RX ORDER — ONDANSETRON 4 MG/1
8 TABLET, FILM COATED ORAL
OUTPATIENT
Start: 2024-06-27

## 2024-04-01 ASSESSMENT — PAIN SCALES - GENERAL: PAINLEVEL: NO PAIN (0)

## 2024-04-01 NOTE — LETTER
4/1/2024         RE: Park Norman  [address]  [facility] [address]        Dear Colleague,    Thank you for referring your patient, Park Norman, to the [facility]. Please see a copy of my visit note below.      FOLLOW UP VISIT    Reason for Visit:  Metastatic CRPC s/p abiraterone (and docetaxel for mHSPC), then enzalutamide for CRPC.  Consideration of 177Lu-PSMA-617 radioligand therapy.    History of Present Illness:  Mr Norman is a Hmong-speaking gentleman who was diagnosed with metastatic prostate cancer in late 2020.  His PSA level on 12/2020 was 63 ng/mL.  He underwent a bone scan at that time, which showed widespread osseous metastases.  He had a prostate biopsy (1/14/2021) which revealed Diamond 5+5=10 adenocarcinoma. No NGS data is available.    He began androgen deprivation therapy (ADT) on 1/21/2021.  Up-front docetaxel chemotherapy was also given, for 6 cycles, between 3/31/2021 and 7/14/2021.  He subsequently developed metastatic CRPC in 12/2021.  Abiraterone was started on 12/28/2021.  This resulted in a favorable treatment response initially; however, the patient has recently developed biochemical progression again.  On 9/15/2023, the PSA was 6.7 ng/mL, which increased from a dodie of 0.7 ng/mL.  By 12/20/23, the PSA was 17.2 ng/mL.  And on 2/28/24, the PSA was 28.6 ng/mL.    He was referred to me for a consideration of clinical trial options versus 177Lu-PSMA-617.  His abiraterone was stopped on 9/15/2023.  He subsequently tried enzalutamide, but this did not produce any PSA response, and it was stopped recently.  A PSMA-PET scan performed on 3/12/2024 showed multiple radiotracer-avid osseous metastases (SUV 21.1 to 54.9) without soft tissue disease.  The patient was accompanied today by his daughter, primarily to discuss 177Lu-PSMA-617 radioligand therapy.    Review of Systems:  Mr Norman reports feeling generally well.  He does have back pain, which might possibly be related to  his metastatic prostate cancer.  He also has worsening left hip pain over the past several weeks, but has declined palliative radiotherapy.  Pain is minimal at rest, but is exacerbated with movement.  He does report hot flashes, although these have improved with the use of venlafaxine.  He reports shrinking of his testicles.  He has not lost or gained any weight recently.  A comprehensive 14-system review of symptoms is otherwise generally within normal limits except as described above.  His ECOG score is 0.  His pain score is 2/10.    Past Medical History:  Aortic insufficiency  Diabetes mellitus, type 2  Hypertension  Hyperlipidemia  Gastroesophageal reflux disease  Chronic gout  Kidney stones  Bone pain  Constipation  Insomnia    Medications:   Eligard 45 mg SQ q6mo  Aspirin 81 mg  Metoprolol 100 mg  Losartan 100 mg  Isosorbide 120 mg  Metformin 500 mg BID  Rosuvastatin 20  Tamsulosin 0.4 mg  Venlafaxine 37.5 mg  Dexamethasone 2 mg BID  Hydromorphone 2 mg PRN  Oxycodone 5-10 mg PRN pain  Acetaminophen 325 mg q4h PRN  Senna 8.6 mg    Physical Examination:  Mr. Norman is a 73-year-old man who appears comfortable at rest.  His vital signs are unremarkable.  His HEENT examination is generally within normal limits.  There is no palpable supraclavicular, cervical, axillary or inguinal lymphadenopathy.  His cardiovascular, pulmonary, and gastrointestinal examinations are generally within normal limits.  The neuromuscular examination is grossly intact.  The extremities do not demonstrate pitting edema.  The skin evaluation is unremarkable.    PSMA-PET scan (3/12/2024):  Findings are consistent with bone-metastatic prostate cancer. There are numerous metastases in the visualized bones showing varying amounts of radiotracer uptake (SUV 21.1 to 54.9). Several examples of lesions showing more intense uptake are given. No convincing evidence for thaddeus or visceral metastases. Enlarged, nodular appearance of the right lobe of the  thyroid gland. As mentioned above, radiotracer uptake has been reported within follicular thyroid neoplasms. A 5.8 cm stable nodule was reported in this area on a 10/10/2022 thyroid ultrasound although the nodule did not meet criteria to recommend FNA at that time.       ASSESSMENT AND PLAN:  Mr. Norman is a 73-year-old man with metastatic CRPC s/p abiraterone, who also received 6 cycles of docetaxel for metastatic hormone-sensitive prostate cancer.  He then received enzalutamide for mCRPC, but derived no benefit.  He is being referred back for consideration of 177Lu-PSMA-617 radioligand therapy versus clinical trial participation.  His ECOG score is 0.  His pain score is 2/10.    Before discussing our clinical trial portfolio, we reviewed some of the other options that are available to him.  For example, he could receive a second chemotherapy agent such as cabazitaxel, but I did not recommend more chemotherapy at this time.  Second, due to his predominant bone metastases which are symptomatic now, he could receive Ra-223 treatment as well.  Third, he could receive 177Lu-PSMA-617 radioligand therapy, since he has been previously treated with both abiraterone/enzalutamide and docetaxel, and his recent PSMA scan showed radiotracer-avid osseous lesions.  Thus, he has multiple therapeutic options remaining.    We then turned our attention to discuss our clinical trial portfolio.  At this time, he would only be eligible for our DOMINGA-280 study, which is a Phase-1 trial using a B7-H3 x CD3 T-cell engager targeting multiple tumor types including prostate cancer.  Since the study just opened, we are not able to estimate the success of this approach compared to 177Lu-PSMA-617.  At the end of our consultation today, we have decided to proceed with 177Lu-PSMA-617 and I will refer him to Nuclear Medicine for radioligand treatment.  He will likely be able to receive his first dose of 177Lu-PSMA-617 over the next 2-3 weeks.    We also  briefly discussed germline and somatic genetic testing.  I will begin by ordering a somatic NGS panel ([employer]) using his prostate biopsy dated 1/4/2021.  If he is found to have any tumoral DNA-repair gene mutations, then I will pursue germline genetic testing afterwards.  If he is found to have an HRR mutation, then this would open the door for potential treatment using a PARP inhibitor as well.    A total of 45 minutes was spent on this patient on the day of the consultation, of which more than 50% of this time was used for counseling and coordination of care.  The patient and his daughter were given the opportunity to ask multiple questions today, all of which were answered to their satisfaction.    The longitudinal plan of care for the diagnosis(es)/condition(s) as documented were addressed during this visit.  Due to the added complexity in care, I will continue to support Mr Norman in the subsequent management and with ongoing continuity of care.    Zhen Fry M.D.

## 2024-04-01 NOTE — NURSING NOTE
"Oncology Rooming Note    April 1, 2024 11:43 AM   Park Norman is a 73 year old male who presents for:    Chief Complaint   Patient presents with    Oncology Clinic Visit     Prostate cancer     Initial Vitals: BP (!) 153/65 (BP Location: Right arm, Patient Position: Sitting, Cuff Size: Adult Regular)   Pulse 63   Temp 98  F (36.7  C) (Oral)   Resp 16   Ht 1.549 m (5' 1\")   Wt 74.1 kg (163 lb 4.8 oz)   SpO2 95%   BMI 30.86 kg/m   Estimated body mass index is 30.86 kg/m  as calculated from the following:    Height as of this encounter: 1.549 m (5' 1\").    Weight as of this encounter: 74.1 kg (163 lb 4.8 oz). Body surface area is 1.79 meters squared.  No Pain (0) Comment: Data Unavailable   No LMP for male patient.  Allergies reviewed: Yes  Medications reviewed: Yes    Medications: Medication refills not needed today.  Pharmacy name entered into EPIC: Data Unavailable    Frailty Screening:   Is the patient here for a new oncology consult visit in cancer care? 2. No      Clinical concerns: none       Peyton Rubio              "

## 2024-04-04 ENCOUNTER — TELEPHONE (OUTPATIENT)
Dept: NUCLEAR MEDICINE | Facility: CLINIC | Age: 74
End: 2024-04-04
Payer: MEDICARE

## 2024-04-04 NOTE — TELEPHONE ENCOUNTER
Called patient to schedule Pluvicto -was directed to call daughter Norma.  Writer phoned Norma who stated the family has had much discussion and at this time they do not want to pursue Pluvicto therapy because of the radiation precautions and the potential risks of future cancer development after receiving Pluvicto.    Writer addressed concerns r/t radiation precautions and answered questions to the satisfaction of Norma.  Gave Norma the phone number for [employer] ([phone]), should she have any additional questions.  She said she will call us by Monday 4/8/24 if the family changes their mind and decides to pursue Pluvicto.    Dr Fry notified of above conversation.